# Patient Record
Sex: MALE | Race: WHITE | NOT HISPANIC OR LATINO | Employment: OTHER | URBAN - METROPOLITAN AREA
[De-identification: names, ages, dates, MRNs, and addresses within clinical notes are randomized per-mention and may not be internally consistent; named-entity substitution may affect disease eponyms.]

---

## 2017-07-17 ENCOUNTER — HOSPITAL ENCOUNTER (EMERGENCY)
Facility: HOSPITAL | Age: 52
Discharge: HOME | End: 2017-07-17
Attending: EMERGENCY MEDICINE
Payer: COMMERCIAL

## 2017-07-17 VITALS
HEART RATE: 69 BPM | TEMPERATURE: 98 F | WEIGHT: 180 LBS | DIASTOLIC BLOOD PRESSURE: 69 MMHG | HEIGHT: 67 IN | SYSTOLIC BLOOD PRESSURE: 127 MMHG | BODY MASS INDEX: 28.25 KG/M2 | OXYGEN SATURATION: 99 % | RESPIRATION RATE: 18 BRPM

## 2017-07-17 DIAGNOSIS — Z23 TETANUS TOXOID VACCINATION ADMINISTERED AT CURRENT VISIT: ICD-10-CM

## 2017-07-17 DIAGNOSIS — S09.90XA HEAD INJURY, INITIAL ENCOUNTER: Primary | ICD-10-CM

## 2017-07-17 DIAGNOSIS — S01.01XA SCALP LACERATION, INITIAL ENCOUNTER: ICD-10-CM

## 2017-07-17 PROCEDURE — 12031 INTMD RPR S/A/T/EXT 2.5 CM/<: CPT

## 2017-07-17 PROCEDURE — 99283 EMERGENCY DEPT VISIT LOW MDM: CPT | Mod: 25

## 2017-07-17 PROCEDURE — 90715 TDAP VACCINE 7 YRS/> IM: CPT | Performed by: PHYSICIAN ASSISTANT

## 2017-07-17 PROCEDURE — 90471 IMMUNIZATION ADMIN: CPT | Performed by: PHYSICIAN ASSISTANT

## 2017-07-17 PROCEDURE — 63600175 PHARM REV CODE 636 W HCPCS: Performed by: PHYSICIAN ASSISTANT

## 2017-07-17 RX ADMIN — CLOSTRIDIUM TETANI TOXOID ANTIGEN (FORMALDEHYDE INACTIVATED), CORYNEBACTERIUM DIPHTHERIAE TOXOID ANTIGEN (FORMALDEHYDE INACTIVATED), BORDETELLA PERTUSSIS TOXOID ANTIGEN (GLUTARALDEHYDE INACTIVATED), BORDETELLA PERTUSSIS FILAMENTOUS HEMAGGLUTININ ANTIGEN (FORMALDEHYDE INACTIVATED), BORDETELLA PERTUSSIS PERTACTIN ANTIGEN, AND BORDETELLA PERTUSSIS FIMBRIAE 2/3 ANTIGEN 0.5 ML: 5; 2; 2.5; 5; 3; 5 INJECTION, SUSPENSION INTRAMUSCULAR at 12:07

## 2017-07-17 NOTE — ED PROVIDER NOTES
"   History      Chief Complaint   Patient presents with    Laceration     Lac to top of head. riding in boat and hit limb. No c/o of loc or N/V       Review of patient's allergies indicates:   Allergen Reactions    Penicillins         HPI   HPI    7/17/2017, 12:30 PM   History obtained from the patient      History of Present Illness: Enrique Rojas is a 52 y.o. male patient who presents to the Emergency Department for scalp laceration since a tree limb fell on head about an hour ago.  He says the limp was about 5" diameter and 3' long.  He denies loc, global headache, n/v. No change in mental status per wife. No blood thinners.  Symptoms are moderate in severity.     No further complaints or concerns at this time.           PCP: Primary Doctor No       Past Medical History:  Past Medical History:   Diagnosis Date    Chronic back pain     Diaphragmatic hernia without mention of obstruction or gangrene 5/28/2015    GERD (gastroesophageal reflux disease)          Past Surgical History:  Past Surgical History:   Procedure Laterality Date    ESOPHAGOGASTRODUODENOSCOPY  05/2015    NECK SURGERY      Cut muscle    TONSILLECTOMY             Family History:  History reviewed. No pertinent family history.        Social History:  Social History     Social History Main Topics    Smoking status: Never Smoker    Smokeless tobacco: Never Used    Alcohol use No    Drug use: No    Sexual activity: Not on file       ROS   Review of Systems   Constitutional: Negative for activity change, chills and fever.   HENT: Negative for rhinorrhea and trouble swallowing.    Eyes: Negative for pain and visual disturbance.   Respiratory: Negative for cough and shortness of breath.    Cardiovascular: Negative for chest pain.   Gastrointestinal: Negative for nausea and vomiting.   Genitourinary: Negative for dysuria.   Musculoskeletal: Negative for gait problem and neck stiffness.   Skin: Negative for pallor and rash.   Neurological: " Negative for facial asymmetry, speech difficulty and weakness.   Hematological: Does not bruise/bleed easily.   All other systems reviewed and are negative.    Review of Systems    Physical Exam      Initial Vitals [07/17/17 1223]   BP Pulse Resp Temp SpO2   128/80 68 18 97.9 °F (36.6 °C) 96 %      MAP       96         Physical Exam  Vital signs and nursing notes reviewed.  Constitutional: Patient is in NAD. Awake and alert. Well-developed and well-nourished.  Head:  Normocephalic.  No mata sign.  1.5cm lac to top of head, no step or crep.  Eyes: PERRL. EOM intact. Conjunctivae nl. No scleral icterus.  No hyphema  ENT: Mucous membranes are moist. Oropharynx is clear.  No hematotympanum  Neck: Supple. No JVD. No lymphadenopathy.  No meningismus.  No midline ttp, +FROM  Cardiovascular: Regular rate and rhythm. No murmurs, rubs, or gallops. Distal pulses are 2+ and symmetric.  Pulmonary/Chest: No respiratory distress. Clear to auscultation bilaterally. No wheezing, rales, or rhonchi.  Abdominal: Soft. Non-distended. No TTP. No rebound, guarding, or rigidity. Good bowel sounds.  Genitourinary: No CVA tenderness  Musculoskeletal: Moves all extremities. No edema.   Skin: Warm and dry.  Neurological: Awake and alert. No acute focal neurological deficits are appreciated. No facial droop.  Tongue is midline.  No pronator drift.  Finger to nose normal.  Hand  equal and strong, 5/5 motor strength x 4.   equal and strong bilaterally  Psychiatric: Normal affect. Good eye contact. Appropriate in content.      ED Course          Lac Repair  Date/Time: 7/17/2017 12:39 PM  Performed by: NOHEMY BURGER  Authorized by: FAN JOE   Consent Done: Yes  Consent: Verbal consent obtained.  Risks and benefits: risks, benefits and alternatives were discussed  Consent given by: patient  Patient understanding: patient states understanding of the procedure being performed  Patient consent: the patient's understanding of the  "procedure matches consent given  Procedure consent: procedure consent matches procedure scheduled  Body area: head/neck  Location details: scalp  Laceration length: 1.5 cm  Foreign bodies: no foreign bodies  Tendon involvement: none  Nerve involvement: none  Vascular damage: no  Patient sedated: no  Preparation: Patient was prepped and draped in the usual sterile fashion.  Irrigation solution: saline  Irrigation method: syringe and jet lavage  Amount of cleaning: extensive  Debridement: none  Degree of undermining: none  Skin closure: staples  Number of sutures: 3  Approximation: close  Approximation difficulty: simple  Patient tolerance: Patient tolerated the procedure well with no immediate complications        ED Vital Signs:  Vitals:    07/17/17 1223 07/17/17 1259   BP: 128/80 127/69   Pulse: 68 69   Resp: 18 18   Temp: 97.9 °F (36.6 °C)    TempSrc: Oral    SpO2: 96% 99%   Weight: 81.6 kg (180 lb)    Height: 5' 7" (1.702 m)                  Imaging Results:  Imaging Results    None            The Emergency Provider reviewed the vital signs and test results, which are outlined above.    ED Discussion             Medication(s) given in the ER:  Medications   Tdap vaccine injection 0.5 mL (0.5 mLs Intramuscular Given 7/17/17 1241)           Follow-up Information     [facility] in 2 days.    Specialty:  Internal Medicine  Contact information:  [address] [facility] [address] [phone]  Additional information:  (off [facility]) 1st floor                     New Prescriptions    No medications on file          Medical Decision Making        All findings were reviewed with the patient/family in detail.  Findings seem to be most consistent with a diagnosis of head injury. Closed Head Injury precautions were discussed with patient and/or family/caretaker  Second impact syndrome was also discussed.    All remaining questions and concerns were addressed at that time.  Patient/family " has been counseled regarding the need for follow-up as well as the indication to return to the emergency room should new or worrisome developments occur.        MDM               Clinical Impression:        ICD-10-CM ICD-9-CM   1. Head injury, initial encounter S09.90XA 959.01   2. Scalp laceration, initial encounter S01.01XA 873.0   3. Tetanus toxoid vaccination administered at current visit Z78.9 V49.89             Cleo Hemphill PA-C  07/17/17 6622

## 2018-01-29 ENCOUNTER — HOSPITAL ENCOUNTER (EMERGENCY)
Facility: HOSPITAL | Age: 53
Discharge: HOME | End: 2018-01-29
Attending: EMERGENCY MEDICINE
Payer: COMMERCIAL

## 2018-01-29 VITALS
TEMPERATURE: 99 F | SYSTOLIC BLOOD PRESSURE: 134 MMHG | WEIGHT: 194 LBS | HEART RATE: 56 BPM | HEIGHT: 67 IN | RESPIRATION RATE: 20 BRPM | OXYGEN SATURATION: 98 % | BODY MASS INDEX: 30.45 KG/M2 | DIASTOLIC BLOOD PRESSURE: 83 MMHG

## 2018-01-29 DIAGNOSIS — J06.9 UPPER RESPIRATORY TRACT INFECTION, UNSPECIFIED TYPE: ICD-10-CM

## 2018-01-29 DIAGNOSIS — R05.9 COUGH: Primary | ICD-10-CM

## 2018-01-29 PROCEDURE — 99283 EMERGENCY DEPT VISIT LOW MDM: CPT

## 2018-01-29 RX ORDER — PROMETHAZINE HYDROCHLORIDE AND DEXTROMETHORPHAN HYDROBROMIDE 6.25; 15 MG/5ML; MG/5ML
5 SYRUP ORAL EVERY 6 HOURS PRN
Qty: 120 ML | Refills: 0 | Status: SHIPPED | OUTPATIENT
Start: 2018-01-29 | End: 2018-02-08

## 2018-01-29 NOTE — ED PROVIDER NOTES
Encounter Date: 1/29/2018       History     Chief Complaint   Patient presents with    URI     nasal congestion and cough since fri. denies any aches or pain. no fever. family had dx flu on fri.     The history is provided by the patient.   URI   The primary symptoms include cough. Primary symptoms do not include fever, sore throat, nausea, vomiting, myalgias, arthralgias or rash. The current episode started several days ago.   The cough is productive. There is nondescript sputum produced.   The onset of the illness is associated with exposure to sick contacts. Symptoms associated with the illness include plugged ear sensation and congestion.     Review of patient's allergies indicates:   Allergen Reactions    Penicillins      Past Medical History:   Diagnosis Date    Chronic back pain     Diaphragmatic hernia without mention of obstruction or gangrene 5/28/2015    GERD (gastroesophageal reflux disease)      Past Surgical History:   Procedure Laterality Date    ESOPHAGOGASTRODUODENOSCOPY  05/2015    NECK SURGERY      Cut muscle    TONSILLECTOMY       History reviewed. No pertinent family history.  Social History   Substance Use Topics    Smoking status: Never Smoker    Smokeless tobacco: Never Used    Alcohol use No     Review of Systems   Constitutional: Negative for fever.   HENT: Positive for congestion. Negative for sore throat.    Respiratory: Positive for cough. Negative for shortness of breath.    Cardiovascular: Negative for chest pain.   Gastrointestinal: Negative for nausea and vomiting.   Genitourinary: Negative for dysuria.   Musculoskeletal: Negative for arthralgias, back pain and myalgias.   Skin: Negative for rash.   Neurological: Negative for weakness.   Hematological: Does not bruise/bleed easily.       Physical Exam     Initial Vitals [01/29/18 0856]   BP Pulse Resp Temp SpO2   134/83 (!) 56 20 98.5 °F (36.9 °C) 98 %      MAP       100         Physical Exam    Nursing note and vitals  reviewed.  Constitutional: He appears well-developed and well-nourished. No distress.   HENT:   Head: Normocephalic and atraumatic.   Mouth/Throat: Posterior oropharyngeal erythema present. No oropharyngeal exudate.   Eyes: Conjunctivae and EOM are normal. Pupils are equal, round, and reactive to light.   Neck: Normal range of motion. Neck supple.   Cardiovascular: Normal rate, regular rhythm and normal heart sounds. Exam reveals no gallop and no friction rub.    No murmur heard.  Pulmonary/Chest: Breath sounds normal. No respiratory distress. He has no wheezes. He has no rhonchi. He has no rales.   Abdominal: Soft. Bowel sounds are normal. He exhibits no distension and no mass. There is no tenderness. There is no rebound and no guarding.   Musculoskeletal: Normal range of motion. He exhibits no edema.   Neurological: He is alert and oriented to person, place, and time. He has normal strength.   Skin: Skin is warm and dry. No rash noted.   Psychiatric: He has a normal mood and affect. Thought content normal.         ED Course   Procedures  Labs Reviewed - No data to display                            ED Course      Clinical Impression:       ICD-10-CM ICD-9-CM   1. Cough R05 786.2   2. Upper respiratory tract infection, unspecified type J06.9 465.9         Disposition:   Disposition: Discharged  Condition: Stable                        Chele Riddle MD  01/29/18 0913

## 2019-03-08 ENCOUNTER — HOSPITAL ENCOUNTER (EMERGENCY)
Facility: HOSPITAL | Age: 54
Discharge: HOME | End: 2019-03-08
Attending: EMERGENCY MEDICINE
Payer: COMMERCIAL

## 2019-03-08 VITALS
BODY MASS INDEX: 28.94 KG/M2 | HEART RATE: 67 BPM | SYSTOLIC BLOOD PRESSURE: 128 MMHG | TEMPERATURE: 99 F | WEIGHT: 190.94 LBS | OXYGEN SATURATION: 97 % | RESPIRATION RATE: 20 BRPM | DIASTOLIC BLOOD PRESSURE: 73 MMHG | HEIGHT: 68 IN

## 2019-03-08 DIAGNOSIS — M79.641 RIGHT HAND PAIN: ICD-10-CM

## 2019-03-08 DIAGNOSIS — L03.113 CELLULITIS OF RIGHT HAND: ICD-10-CM

## 2019-03-08 DIAGNOSIS — J06.9 UPPER RESPIRATORY TRACT INFECTION, UNSPECIFIED TYPE: Primary | ICD-10-CM

## 2019-03-08 LAB
INFLUENZA A, MOLECULAR: NEGATIVE
INFLUENZA B, MOLECULAR: NEGATIVE
SPECIMEN SOURCE: NORMAL

## 2019-03-08 PROCEDURE — 25000003 PHARM REV CODE 250: Mod: ER | Performed by: EMERGENCY MEDICINE

## 2019-03-08 PROCEDURE — 87502 INFLUENZA DNA AMP PROBE: CPT | Mod: ER

## 2019-03-08 PROCEDURE — 99284 EMERGENCY DEPT VISIT MOD MDM: CPT | Mod: 25,ER

## 2019-03-08 RX ORDER — SULFAMETHOXAZOLE AND TRIMETHOPRIM 800; 160 MG/1; MG/1
1 TABLET ORAL
Status: COMPLETED | OUTPATIENT
Start: 2019-03-08 | End: 2019-03-08

## 2019-03-08 RX ORDER — ACETAMINOPHEN 500 MG
1000 TABLET ORAL
Status: COMPLETED | OUTPATIENT
Start: 2019-03-08 | End: 2019-03-08

## 2019-03-08 RX ORDER — NAPROXEN 500 MG/1
500 TABLET ORAL 2 TIMES DAILY WITH MEALS
Qty: 20 TABLET | Refills: 0 | Status: SHIPPED | OUTPATIENT
Start: 2019-03-08 | End: 2019-03-18

## 2019-03-08 RX ORDER — SULFAMETHOXAZOLE AND TRIMETHOPRIM 800; 160 MG/1; MG/1
1 TABLET ORAL 2 TIMES DAILY
Qty: 14 TABLET | Refills: 0 | Status: SHIPPED | OUTPATIENT
Start: 2019-03-08 | End: 2019-03-15

## 2019-03-08 RX ADMIN — ACETAMINOPHEN 1000 MG: 500 TABLET ORAL at 10:03

## 2019-03-08 RX ADMIN — SULFAMETHOXAZOLE AND TRIMETHOPRIM 1 TABLET: 800; 160 TABLET ORAL at 11:03

## 2019-03-08 NOTE — ED PROVIDER NOTES
Encounter Date: 3/8/2019       History     Chief Complaint   Patient presents with    Hand Pain     right hand with swelling and pain after puncture type wound while [place]also flu like sypmtoms since 5:30pm yest fever and achy all over.     The history is provided by the patient.   Hand Injury    The incident occurred yesterday. [place] Injury mechanism: fish traylor poked right thenar emenance yesterday. The pain is present in the right hand (right hand). The quality of the pain is described as aching. Pain scale: mild, pt declined pain medications at this point in time. The pain has been constant since the incident. Pertinent negatives include no fever. He reports no foreign bodies present. The symptoms are aggravated by movement, use and palpation. He has tried nothing for the symptoms. The treatment provided no relief.   URI   The primary symptoms include sore throat and cough. Primary symptoms do not include fever, fatigue, headaches, ear pain, swollen glands, wheezing, abdominal pain, nausea, vomiting, myalgias, arthralgias or rash. The current episode started yesterday.   The sore throat began yesterday. The sore throat has been unchanged since its onset. The sore throat is not accompanied by trouble swallowing, drooling, hoarse voice or stridor. Sore Throat Pain Scale: mild.   The cough began yesterday. The cough is non-productive. There is nondescript sputum produced.   The onset of the illness is associated with exposure to sick contacts. Symptoms associated with the illness include congestion. The illness is not associated with chills, plugged ear sensation, facial pain, sinus pressure or rhinorrhea. The following treatments were addressed: Acetaminophen was effective. A decongestant was not tried. Aspirin was not tried. NSAIDs were not tried.     Review of patient's allergies indicates:   Allergen Reactions    Penicillins       Past Medical History:   Diagnosis Date    Chronic back pain     Diaphragmatic hernia without mention of obstruction or gangrene 5/28/2015    GERD (gastroesophageal reflux disease)      Past Surgical History:   Procedure Laterality Date    ESOPHAGOGASTRODUODENOSCOPY  05/2015    ESOPHAGOGASTRODUODENOSCOPY (EGD) N/A 5/17/2016    [facility]    ESOPHAGOGASTRODUODENOSCOPY (EGD) N/A 3/22/2016    Per[facility] by [facility] at [facility]    ESOPHAGOGASTRODUODENOSCOPY (EGD) N/A 5/28/2015    Performed by Lee [facility]ammell [facility] at [facility] ENDO    NECK SURGERY      Cut muscle    TONSILLECTOMY       History reviewed. No pertinent family history.  Social History     Tobacco Use    Smoking status: Never Smoker    Smokeless tobacco: Never Used   Substance Use Topics    Alcohol use: No    Drug use: No     Review of Systems   Constitutional: Negative for chills, fatigue and fever.   HENT: Positive for congestion and sore throat. Negative for drooling, ear pain, hoarse voice, rhinorrhea, sinus pressure and trouble swallowing.    Respiratory: Positive for cough. Negative for shortness of breath, wheezing and stridor.    Cardiovascular: Negative for chest pain.   Gastrointestinal: Negative for abdominal pain, nausea and vomiting.   Genitourinary: Negative for dysuria.   Musculoskeletal: Negative for arthralgias, back pain and myalgias.   Skin: Negative for rash.   Neurological: Negative for weakness and headaches.   Hematological: Does not bruise/bleed easily.   All other systems reviewed and are negative.      Physical Exam     Initial Vitals [03/08/19 1038]   BP Pulse Resp Temp SpO2   128/73 67 20 98.9 °F (37.2 °C) 97 %      MAP       --         Physical Exam    Nursing note and vitals reviewed.  Constitutional: He appears well-developed and well-nourished. He is not diaphoretic. No distress.   HENT:   Head: Normocephalic and atraumatic.   Right Ear: Hearing, tympanic membrane,  external ear and ear canal normal.   Left Ear: Hearing, tympanic membrane, external ear and ear canal normal.   Nose: Mucosal edema present. Right sinus exhibits no maxillary sinus tenderness and no frontal sinus tenderness. Left sinus exhibits no maxillary sinus tenderness and no frontal sinus tenderness.   Mouth/Throat: Uvula is midline and mucous membranes are normal. Posterior oropharyngeal erythema present.   Eyes: EOM are normal. Pupils are equal, round, and reactive to light. No scleral icterus.   Neck: Trachea normal, normal range of motion and phonation normal. Neck supple. No thyromegaly present.   Cardiovascular: Normal rate, regular rhythm, normal heart sounds and intact distal pulses. Exam reveals no gallop and no friction rub.    No murmur heard.  Pulmonary/Chest: Breath sounds normal. No respiratory distress. He has no wheezes. He has no rhonchi. He exhibits no tenderness.   Abdominal: Soft. Bowel sounds are normal. He exhibits no distension. There is no tenderness. There is no rebound and no guarding.   Musculoskeletal: Normal range of motion. He exhibits no edema.        Right wrist: He exhibits tenderness.        Right forearm: He exhibits tenderness.        Right hand: He exhibits tenderness and swelling. Normal sensation noted. Normal strength noted.        Hands:  Small puncture wound with surrounding erythema and edema with tracking up right forearm   Lymphadenopathy:     He has no cervical adenopathy.   Neurological: He is alert and oriented to person, place, and time. He has normal strength. No cranial nerve deficit or sensory deficit.   Skin: Skin is warm and dry.   Psychiatric: He has a normal mood and affect. His behavior is normal. Judgment and thought content normal.         ED Course   Procedures  Labs Reviewed   INFLUENZA A & B BY MOLECULAR          Imaging Results          X-Ray Wrist Complete Right (Final result)  Result time 03/08/19 11:14:48    Final result by GANESH Basilio Sr.,  "MD (03/08/19 11:14:48)                 Impression:      Normal study.      Electronically signed by: Mitch Basilio MD  Date:    03/08/2019  Time:    11:14             Narrative:    EXAMINATION:  XR WRIST COMPLETE 3 VIEWS RIGHT    CLINICAL HISTORY:  Pain in right hand    COMPARISON:  None    FINDINGS:  There is no fracture. There is no dislocation.                               X-Ray Hand 3 view Right (Final result)  Result time 03/08/19 11:13:43    Final result by GANESH Basilio Sr., MD (03/08/19 11:13:43)                 Impression:      Normal study.      Electronically signed by: Mitch Basilio MD  Date:    03/08/2019  Time:    11:13             Narrative:    EXAMINATION:  XR HAND COMPLETE 3 VIEW RIGHT    CLINICAL HISTORY:  right hand pain;    COMPARISON:  None    FINDINGS:  There is no fracture. There is no dislocation.                                       Vitals:    03/08/19 1038   BP: 128/73   Pulse: 67   Resp: 20   Temp: 98.9 °F (37.2 °C)   TempSrc: Oral   SpO2: 97%   Weight: 86.6 kg (190 lb 14.7 oz)   Height: 5' 8" (1.727 m)       Results for orders placed or performed during the hospital encounter of 03/08/19   Influenza A & B by Molecular   Result Value Ref Range    Influenza A, Molecular Negative Negative    Influenza B, Molecular Negative Negative    Flu A & B Source NP          Imaging Results          X-Ray Wrist Complete Right (Final result)  Result time 03/08/19 11:14:48    Final result by GANESH Basilio Sr., MD (03/08/19 11:14:48)                 Impression:      Normal study.      Electronically signed by: Mitch Basilio MD  Date:    03/08/2019  Time:    11:14             Narrative:    EXAMINATION:  XR WRIST COMPLETE 3 VIEWS RIGHT    CLINICAL HISTORY:  Pain in right hand    COMPARISON:  None    FINDINGS:  There is no fracture. There is no dislocation.                               X-Ray Hand 3 view Right (Final result)  Result time 03/08/19 11:13:43    Final result by GANESH Basilio Sr." MD (03/08/19 11:13:43)                 Impression:      Normal study.      Electronically signed by: Mitch Basilio MD  Date:    03/08/2019  Time:    11:13             Narrative:    EXAMINATION:  XR HAND COMPLETE 3 VIEW RIGHT    CLINICAL HISTORY:  right hand pain;    COMPARISON:  None    FINDINGS:  There is no fracture. There is no dislocation.                                Medications   acetaminophen tablet 1,000 mg (1,000 mg Oral Given 3/8/19 1049)   sulfamethoxazole-trimethoprim 800-160mg per tablet 1 tablet (1 tablet Oral Given 3/8/19 1126)       11:17 AM - Re-evaluation: The patient is resting comfortably and is in no acute distress. He states that his symptoms have improved after treatment within ER. Discussed test results, shared treatment plan, specific conditions for return, and importance of follow up with patient and family.  He understands and agrees with the plan as discussed. Answered  his questions at this time. He has remained hemodynamically stable throughout the ED course and is appropriate for discharge home.     Regarding UPPER RESPIRATORY ILLNESS, for treatment, I encouraged patient to: drink plenty of fluids; get lots of rest; take medications as prescribed; use over-the-counter medications for symptomatic treatment;  and use a humidifier or steam in the bathroom to improve patency of upper airway.  Patient instructed to notify primary care provider, or return to the emergency department, if the they: have a cough most days or have a cough that returns frequently; begin coughing up blood; develop a high fever or shaking chills; have a low-grade fever for three or more days; develop thick, greenish mucus, especially if it has a bad smell; and experience shortness of breath or chest pain. For prevention, discussed with patient the importance of refraining from smoking (if applicable), getting annual influenza vaccines, reducing exposure to air pollution, and the need to frequently wash hands to  avoid spread of infection.     For  CELLULITIS, I advised patient to: keep area clean and dry; apply antibiotic ointment as prescribed; refrain from squeezing or irritating site; apply moist heat to help with pain and abscess drainage; and to take antibiotics as prescribed. Patient was instructed to follow up with primary care provider, urgent care facility, or the emergency room if symptoms worsen and they develop a fever greater than 102.5 °F, have pain unrelieved by OTC or prescription medications, and excessive swelling. Also instructed patient to follow up with provider in 24-48 hours for wound re-check.    Pre-hypertension/Hypertension: The pt has been informed that they may have pre-hypertension or hypertension based on a blood pressure reading in the ED. I recommend that the pt call the PCP listed on their discharge instructions or a physician of their choice this week to arrange f/u for further evaluation of possible pre-hypertension or hypertension.     Enrique Rojas was given a handout which discussed their disease process, precautions, and instructions for follow-up and therapy.    Follow-up Information     [facility]. Schedule an appointment as soon as possible for a visit in 1 week.    Contact information:  [address]  [place] [address]  [phone]             [facility] - Internal Medicine. Schedule an appointment as soon as possible for a visit in 1 week.    Specialty:  Internal Medicine  Contact information:  [address] [address] [id number] [phone]           [facility].    Specialty:  Emergency Medicine  Why:  As needed, If symptoms worsen  Contact information:  [address] [address] [id number] [phone]                    Medication List      START taking these medications    naproxen 500 MG EC tablet  Commonly known as:  EC NAPROSYN  Take 1 tablet (500 mg total) by mouth 2 (two) times daily with meals. for  10 days     sulfamethoxazole-trimethoprim 800-160mg 800-160 mg Tab  Commonly known as:  BACTRIM DS  Take 1 tablet by mouth 2 (two) times daily. for 7 days        ASK your doctor about these medications    fluticasone 50 mcg/actuation nasal spray  Commonly known as:  FLONASE     omeprazole 20 MG capsule  Commonly known as:  PRILOSEC  Take 2 capsules (40 mg total) by mouth once daily.           Where to Get Your Medications      You can get these medications from any pharmacy    Bring a paper prescription for each of these medications  · naproxen 500 MG EC tablet  · sulfamethoxazole-trimethoprim 800-160mg 800-160 mg Tab        Current Discharge Medication List            ED Diagnosis  1. Upper respiratory tract infection, unspecified type    2. Right hand pain    3. Cellulitis of right hand                          Clinical Impression:       ICD-10-CM ICD-9-CM   1. Upper respiratory tract infection, unspecified type J06.9 465.9   2. Right hand pain M79.641 729.5   3. Cellulitis of right hand L03.113 682.4         Disposition:   Disposition: Discharged  Condition: Stable                        Carlos Gillespie Jr., MD  03/08/19 1535

## 2019-03-08 NOTE — ED NOTES
Aaox3, skin warm and dry, resp unlabored and even. amb with steady gait and byers well. C/o right hand pain with swelling post puncturing yest with fish traylor and also feels may have flu- fever and achy all over.

## 2019-03-08 NOTE — DISCHARGE INSTRUCTIONS
For  CELLULITIS, I advised patient to: keep area clean and dry; apply antibiotic ointment as prescribed; refrain from squeezing or irritating site; apply moist heat to help with pain and abscess drainage; and to take antibiotics as prescribed. Patient was instructed to follow up with primary care provider, urgent care facility, or the emergency room if symptoms worsen and they develop a fever greater than 102.5 °F, have pain unrelieved by OTC or prescription medications, and excessive swelling. Also instructed patient to follow up with provider in 24-48 hours for wound re-check.    Regarding UPPER RESPIRATORY ILLNESS, for treatment, I encouraged patient to: drink plenty of fluids; get lots of rest; take medications as prescribed; use over-the-counter medications for symptomatic treatment;  and use a humidifier or steam in the bathroom to improve patency of upper airway.  Patient instructed to notify primary care provider, or return to the emergency department, if the they: have a cough most days or have a cough that returns frequently; begin coughing up blood; develop a high fever or shaking chills; have a low-grade fever for three or more days; develop thick, greenish mucus, especially if it has a bad smell; and experience shortness of breath or chest pain. For prevention, discussed with patient the importance of refraining from smoking (if applicable), getting annual influenza vaccines, reducing exposure to air pollution, and the need to frequently wash hands to avoid spread of infection.

## 2023-06-29 PROBLEM — Z76.89 ENCOUNTER TO ESTABLISH CARE: Status: ACTIVE | Noted: 2023-06-29

## 2023-06-29 PROBLEM — K21.9 GERD (GASTROESOPHAGEAL REFLUX DISEASE): Status: ACTIVE | Noted: 2023-06-29

## 2023-06-29 PROBLEM — K40.90 LEFT INGUINAL HERNIA: Status: ACTIVE | Noted: 2023-06-29

## 2023-08-10 PROBLEM — R00.1 BRADYCARDIA: Status: ACTIVE | Noted: 2023-08-10

## 2023-08-10 PROBLEM — Z00.00 WELLNESS EXAMINATION: Status: ACTIVE | Noted: 2023-08-10

## 2023-08-10 PROBLEM — E78.5 HYPERLIPIDEMIA: Status: ACTIVE | Noted: 2023-08-10

## 2023-08-15 DIAGNOSIS — K40.90 INGUINAL HERNIA OF LEFT SIDE WITHOUT OBSTRUCTION OR GANGRENE: ICD-10-CM

## 2023-08-15 DIAGNOSIS — K40.90 LEFT INGUINAL HERNIA: Primary | ICD-10-CM

## 2023-08-15 RX ORDER — SODIUM CHLORIDE 9 MG/ML
INJECTION, SOLUTION INTRAVENOUS CONTINUOUS
Status: CANCELLED | OUTPATIENT
Start: 2023-08-15

## 2023-08-18 ENCOUNTER — HOSPITAL ENCOUNTER (OUTPATIENT)
Dept: PREADMISSION TESTING | Facility: HOSPITAL | Age: 58
Discharge: HOME | End: 2023-08-18
Attending: SURGERY
Payer: COMMERCIAL

## 2023-08-18 ENCOUNTER — ANESTHESIA EVENT (OUTPATIENT)
Dept: SURGERY | Facility: HOSPITAL | Age: 58
End: 2023-08-18
Payer: COMMERCIAL

## 2023-08-18 VITALS — BODY MASS INDEX: 28.25 KG/M2 | WEIGHT: 180 LBS | HEIGHT: 67 IN

## 2023-08-18 NOTE — PRE-PROCEDURE INSTRUCTIONS
PREVENTION PLUS EKG ON CHART/SCANNED INTO MEDIA DATED 08/10/98049-YWAH TO DR. STEWART    HR 08/18/2023 59 bpm

## 2023-08-18 NOTE — ANESTHESIA PREPROCEDURE EVALUATION
08/18/2023  Enrique Rojas is a 58 y.o., male.      Pre-op Assessment    I have reviewed the Patient Summary Reports.    I have reviewed the NPO Status.   I have reviewed the Medications.     Review of Systems  Anesthesia Hx:  No problems with previous Anesthesia  Denies Family Hx of Anesthesia complications.   Denies Personal Hx of Anesthesia complications.   Social:  Non-Smoker    Cardiovascular:  Cardiovascular Normal     Pulmonary:  Pulmonary Normal    Renal/:  Renal/ Normal     Hepatic/GI:   GERD, well controlled    Neurological:  Neurology Normal    Endocrine:  Endocrine Normal      Lab Results   Component Value Date    WBC 4.2 07/06/2023    HGB 14.5 07/06/2023    HCT 43.0 07/06/2023    MCV 91 07/06/2023     (L) 07/06/2023     CMP  Sodium   Date Value Ref Range Status   07/06/2023 139 134 - 144 mmol/L Final     Potassium   Date Value Ref Range Status   07/06/2023 4.2 3.5 - 5.2 mmol/L Final     Chloride   Date Value Ref Range Status   07/06/2023 105 96 - 106 mmol/L Final     CO2   Date Value Ref Range Status   07/06/2023 23 20 - 29 mmol/L Final     Glucose   Date Value Ref Range Status   07/06/2023 91 70 - 99 mg/dL Final     BUN   Date Value Ref Range Status   07/06/2023 14 8 - 27 mg/dL Final     Creatinine   Date Value Ref Range Status   07/06/2023 0.99 0.76 - 1.27 mg/dL Final     Calcium   Date Value Ref Range Status   07/06/2023 9.0 8.6 - 10.2 mg/dL Final     Albumin   Date Value Ref Range Status   07/06/2023 3.9 3.8 - 4.9 g/dL Final     Comment:                    **Effective July 10, 2023 Albumin reference interval**                   will be changing to:                              Age                  Male          Female                             0 -   7 days       3.6 - 4.9      3.6 - 4.9                             8 -  30 days       3.5 - 4.6      3.5 - 4.6                              1 -   6 months     3.7 - 4.8      3.7 - 4.8                      7 months -   2 years      4.0 - 5.0      4.0 - 5.0                             3 -   5 years      4.1 - 5.0      4.1 - 5.0                             6 -  12 years      4.2 - 5.0      4.2 - 5.0                            13 -  30 years      4.3 - 5.2      4.0 - 5.0                            31 -  50 years      4.1 - 5.1      3.9 - 4.9                            51 -  60 years      3.8 - 4.9      3.8 - 4.9                            61 -  70 years      3.9 - 4.9      3.9 - 4.9                            71 -  80 years      3.8 - 4.8      3.8 - 4.8                            81 -  89 years      3.7 - 4.7      3.7 - 4.7                            90 - 199 years      3.6 - 4.6      3.6 - 4.6       Total Bilirubin   Date Value Ref Range Status   07/06/2023 0.4 0.0 - 1.2 mg/dL Final     AST   Date Value Ref Range Status   07/06/2023 18 0 - 40 IU/L Final     ALT   Date Value Ref Range Status   07/06/2023 18 0 - 44 IU/L Final     eGFR   Date Value Ref Range Status   07/06/2023 87 >59 mL/min/1.73 Final       Physical Exam  General: Well nourished    Airway:  Mallampati: III / II  Mouth Opening: Normal  TM Distance: Normal  Tongue: Normal  Neck ROM: Normal ROM    Dental:  Intact    Chest/Lungs:  Clear to auscultation    Heart:  Rate: Normal  Rhythm: Regular Rhythm  Sounds: Normal        Anesthesia Plan  Type of Anesthesia, risks & benefits discussed:    Anesthesia Type: Gen ETT, Gen Supraglottic Airway  Intra-op Monitoring Plan: Standard ASA Monitors  Post Op Pain Control Plan: multimodal analgesia  Induction:  IV  Airway Plan: Direct  Informed Consent: Informed consent signed with the Patient and all parties understand the risks and agree with anesthesia plan.  All questions answered.   ASA Score: 2  Day of Surgery Review of History & Physical: I have interviewed and examined the patient. I have reviewed the patient's H&P dated: There are no significant  changes.     Ready For Surgery From Anesthesia Perspective.     .

## 2023-08-22 ENCOUNTER — ANESTHESIA (OUTPATIENT)
Dept: SURGERY | Facility: HOSPITAL | Age: 58
End: 2023-08-22
Payer: COMMERCIAL

## 2023-08-22 ENCOUNTER — HOSPITAL ENCOUNTER (OUTPATIENT)
Facility: HOSPITAL | Age: 58
Discharge: HOME | End: 2023-08-22
Attending: SURGERY | Admitting: SURGERY
Payer: COMMERCIAL

## 2023-08-22 VITALS
RESPIRATION RATE: 20 BRPM | OXYGEN SATURATION: 99 % | DIASTOLIC BLOOD PRESSURE: 71 MMHG | SYSTOLIC BLOOD PRESSURE: 130 MMHG | TEMPERATURE: 98 F | HEART RATE: 49 BPM

## 2023-08-22 DIAGNOSIS — K40.90 INGUINAL HERNIA OF LEFT SIDE WITHOUT OBSTRUCTION OR GANGRENE: Primary | ICD-10-CM

## 2023-08-22 PROCEDURE — 63600175 PHARM REV CODE 636 W HCPCS: Performed by: SURGERY

## 2023-08-22 PROCEDURE — 36000706: Performed by: SURGERY

## 2023-08-22 PROCEDURE — 25000003 PHARM REV CODE 250: Performed by: SURGERY

## 2023-08-22 PROCEDURE — 36000707: Performed by: SURGERY

## 2023-08-22 PROCEDURE — C1729 CATH, DRAINAGE: HCPCS | Performed by: SURGERY

## 2023-08-22 PROCEDURE — 71000033 HC RECOVERY, INTIAL HOUR: Performed by: SURGERY

## 2023-08-22 PROCEDURE — 37000008 HC ANESTHESIA 1ST 15 MINUTES: Performed by: SURGERY

## 2023-08-22 PROCEDURE — 63600175 PHARM REV CODE 636 W HCPCS: Performed by: NURSE ANESTHETIST, CERTIFIED REGISTERED

## 2023-08-22 PROCEDURE — C1781 MESH (IMPLANTABLE): HCPCS | Performed by: SURGERY

## 2023-08-22 PROCEDURE — 25000003 PHARM REV CODE 250: Performed by: NURSE ANESTHETIST, CERTIFIED REGISTERED

## 2023-08-22 PROCEDURE — 71000015 HC POSTOP RECOV 1ST HR: Performed by: SURGERY

## 2023-08-22 PROCEDURE — 37000009 HC ANESTHESIA EA ADD 15 MINS: Performed by: SURGERY

## 2023-08-22 PROCEDURE — 71000016 HC POSTOP RECOV ADDL HR: Performed by: SURGERY

## 2023-08-22 DEVICE — SELF-GRIPPING POLYESTER MESH,LEFT ANATOMICAL, POLYESTER WITH POLYLACTIC ACID GRIPS
Type: IMPLANTABLE DEVICE | Site: GROIN | Status: FUNCTIONAL
Brand: PROGRIP

## 2023-08-22 RX ORDER — ONDANSETRON 2 MG/ML
4 INJECTION INTRAMUSCULAR; INTRAVENOUS EVERY 6 HOURS PRN
Status: DISCONTINUED | OUTPATIENT
Start: 2023-08-22 | End: 2023-08-22 | Stop reason: HOSPADM

## 2023-08-22 RX ORDER — MIDAZOLAM HYDROCHLORIDE 1 MG/ML
INJECTION INTRAMUSCULAR; INTRAVENOUS
Status: DISCONTINUED | OUTPATIENT
Start: 2023-08-22 | End: 2023-08-22

## 2023-08-22 RX ORDER — EPHEDRINE SULFATE 50 MG/ML
INJECTION, SOLUTION INTRAVENOUS
Status: DISCONTINUED | OUTPATIENT
Start: 2023-08-22 | End: 2023-08-22

## 2023-08-22 RX ORDER — ONDANSETRON 2 MG/ML
INJECTION INTRAMUSCULAR; INTRAVENOUS
Status: DISCONTINUED | OUTPATIENT
Start: 2023-08-22 | End: 2023-08-22

## 2023-08-22 RX ORDER — HYDROMORPHONE HYDROCHLORIDE 2 MG/ML
0.5 INJECTION, SOLUTION INTRAMUSCULAR; INTRAVENOUS; SUBCUTANEOUS EVERY 5 MIN PRN
Status: DISCONTINUED | OUTPATIENT
Start: 2023-08-22 | End: 2023-08-22 | Stop reason: HOSPADM

## 2023-08-22 RX ORDER — SODIUM CHLORIDE 9 MG/ML
INJECTION, SOLUTION INTRAVENOUS CONTINUOUS
Status: DISCONTINUED | OUTPATIENT
Start: 2023-08-22 | End: 2023-08-22 | Stop reason: HOSPADM

## 2023-08-22 RX ORDER — PROCHLORPERAZINE EDISYLATE 5 MG/ML
5 INJECTION INTRAMUSCULAR; INTRAVENOUS EVERY 6 HOURS PRN
Status: DISCONTINUED | OUTPATIENT
Start: 2023-08-22 | End: 2023-08-22 | Stop reason: HOSPADM

## 2023-08-22 RX ORDER — HYDROMORPHONE HYDROCHLORIDE 1 MG/ML
1 INJECTION, SOLUTION INTRAMUSCULAR; INTRAVENOUS; SUBCUTANEOUS EVERY 4 HOURS PRN
Status: DISCONTINUED | OUTPATIENT
Start: 2023-08-22 | End: 2023-08-22 | Stop reason: HOSPADM

## 2023-08-22 RX ORDER — MORPHINE SULFATE 10 MG/ML
INJECTION, SOLUTION INTRAMUSCULAR; INTRAVENOUS
Status: DISCONTINUED | OUTPATIENT
Start: 2023-08-22 | End: 2023-08-22

## 2023-08-22 RX ORDER — FENTANYL CITRATE 50 UG/ML
INJECTION, SOLUTION INTRAMUSCULAR; INTRAVENOUS
Status: DISCONTINUED | OUTPATIENT
Start: 2023-08-22 | End: 2023-08-22

## 2023-08-22 RX ORDER — PROPOFOL 10 MG/ML
INJECTION, EMULSION INTRAVENOUS
Status: DISCONTINUED | OUTPATIENT
Start: 2023-08-22 | End: 2023-08-22

## 2023-08-22 RX ORDER — DIPHENHYDRAMINE HYDROCHLORIDE 50 MG/ML
25 INJECTION INTRAMUSCULAR; INTRAVENOUS EVERY 6 HOURS PRN
Status: DISCONTINUED | OUTPATIENT
Start: 2023-08-22 | End: 2023-08-22 | Stop reason: HOSPADM

## 2023-08-22 RX ORDER — BUPIVACAINE HYDROCHLORIDE AND EPINEPHRINE 2.5; 5 MG/ML; UG/ML
INJECTION, SOLUTION EPIDURAL; INFILTRATION; INTRACAUDAL; PERINEURAL
Status: DISCONTINUED | OUTPATIENT
Start: 2023-08-22 | End: 2023-08-22 | Stop reason: HOSPADM

## 2023-08-22 RX ORDER — KETOROLAC TROMETHAMINE 30 MG/ML
INJECTION, SOLUTION INTRAMUSCULAR; INTRAVENOUS
Status: DISCONTINUED | OUTPATIENT
Start: 2023-08-22 | End: 2023-08-22

## 2023-08-22 RX ORDER — LIDOCAINE HYDROCHLORIDE 10 MG/ML
INJECTION, SOLUTION INTRAVENOUS
Status: DISCONTINUED | OUTPATIENT
Start: 2023-08-22 | End: 2023-08-22

## 2023-08-22 RX ORDER — ACETAMINOPHEN 10 MG/ML
INJECTION, SOLUTION INTRAVENOUS
Status: DISCONTINUED | OUTPATIENT
Start: 2023-08-22 | End: 2023-08-22

## 2023-08-22 RX ORDER — ONDANSETRON 2 MG/ML
4 INJECTION INTRAMUSCULAR; INTRAVENOUS DAILY PRN
Status: DISCONTINUED | OUTPATIENT
Start: 2023-08-22 | End: 2023-08-22 | Stop reason: HOSPADM

## 2023-08-22 RX ORDER — MORPHINE SULFATE 4 MG/ML
4 INJECTION, SOLUTION INTRAMUSCULAR; INTRAVENOUS EVERY 5 MIN PRN
Status: DISCONTINUED | OUTPATIENT
Start: 2023-08-22 | End: 2023-08-22 | Stop reason: HOSPADM

## 2023-08-22 RX ORDER — TRAMADOL HYDROCHLORIDE 50 MG/1
50 TABLET ORAL EVERY 4 HOURS PRN
Status: DISCONTINUED | OUTPATIENT
Start: 2023-08-22 | End: 2023-08-22 | Stop reason: HOSPADM

## 2023-08-22 RX ORDER — HYDROCODONE BITARTRATE AND ACETAMINOPHEN 5; 325 MG/1; MG/1
1 TABLET ORAL EVERY 4 HOURS PRN
Status: DISCONTINUED | OUTPATIENT
Start: 2023-08-22 | End: 2023-08-22 | Stop reason: HOSPADM

## 2023-08-22 RX ADMIN — SODIUM CHLORIDE: 9 INJECTION, SOLUTION INTRAVENOUS at 12:08

## 2023-08-22 RX ADMIN — MIDAZOLAM 2 MG: 1 INJECTION INTRAMUSCULAR; INTRAVENOUS at 11:08

## 2023-08-22 RX ADMIN — MORPHINE SULFATE 5 MG: 10 INJECTION, SOLUTION INTRAMUSCULAR; INTRAVENOUS at 01:08

## 2023-08-22 RX ADMIN — FENTANYL CITRATE 50 MCG: 50 INJECTION, SOLUTION INTRAMUSCULAR; INTRAVENOUS at 12:08

## 2023-08-22 RX ADMIN — ACETAMINOPHEN 1000 MG: 10 INJECTION, SOLUTION INTRAVENOUS at 12:08

## 2023-08-22 RX ADMIN — PROPOFOL 200 MG: 10 INJECTION, EMULSION INTRAVENOUS at 11:08

## 2023-08-22 RX ADMIN — ONDANSETRON HYDROCHLORIDE 4 MG: 2 SOLUTION INTRAMUSCULAR; INTRAVENOUS at 02:08

## 2023-08-22 RX ADMIN — EPHEDRINE SULFATE 10 MG: 50 INJECTION, SOLUTION INTRAVENOUS at 12:08

## 2023-08-22 RX ADMIN — SODIUM CHLORIDE: 9 INJECTION, SOLUTION INTRAVENOUS at 10:08

## 2023-08-22 RX ADMIN — ONDANSETRON 4 MG: 2 INJECTION INTRAMUSCULAR; INTRAVENOUS at 11:08

## 2023-08-22 RX ADMIN — KETOROLAC TROMETHAMINE 30 MG: 30 INJECTION, SOLUTION INTRAMUSCULAR at 01:08

## 2023-08-22 RX ADMIN — VANCOMYCIN HYDROCHLORIDE 1000 MG: 1 INJECTION, POWDER, LYOPHILIZED, FOR SOLUTION INTRAVENOUS at 11:08

## 2023-08-22 RX ADMIN — LIDOCAINE HYDROCHLORIDE 50 MG: 10 INJECTION, SOLUTION INTRAVENOUS at 11:08

## 2023-08-22 RX ADMIN — FENTANYL CITRATE 100 MCG: 50 INJECTION, SOLUTION INTRAMUSCULAR; INTRAVENOUS at 11:08

## 2023-08-22 NOTE — DISCHARGE INSTRUCTIONS
No driving  or alcohol 24 hours after surgery    no heavy lifting   straining   strenuous activity     follow up with dr evangelista as scheduled    in 48 hours shower  remove dressing  clean with antibacterial soap and water  and keep dry   no tub baths  shower   only   do not submerge area in water

## 2023-08-22 NOTE — OP NOTE
[facility] - Surgery  Inguinal Herniorrhaphy  Procedure Note    SUMMARY     Date of Procedure: 8/22/2023     Procedure: Procedure(s) (LRB):  REPAIR, HERNIA, INGUINAL with mesh (Left)     Surgeon(s) and Role:     * Cadence Hernandez MD - Primary    Assisting Surgeon: None    Indications: The patient has a symptomatic left inguinal hernia.      Pre-Operative Diagnosis: left inguinal hernia    Post-Operative Diagnosis: left inguinal hernia    Anesthesia: General with local(1/2% marcaine with epi)    Technical Procedures Used: Antonina repair     Description of the Findings of the Procedure: large indirect sac with slightly weak floor    The patient was seen in the Holding Room. The risks, benefits, complications, treatment options, and expected outcomes were discussed with the patient. The possibilities of reaction to medication, pain, infection, bleeding, heart attack, death, injury to internal organs, recurrence, testicular damage, infertility, or need for further surgery were discussed with the patient. The patient concurred with the proposed plan, giving informed consent.  The site of surgery properly noted/marked. The patient was taken to Operating Room # A, identified as Enrique Rojas and the procedure verified as Herniorrhaphy. A Time Out was held and the above information confirmed.    The patient was placed supine.  After the patient was anesthetized, the groin was prepped and draped in the standard fashion. Marcaine 0.5% with epinephrine was used to anesthetize the skin over the inguinal ligament along a line from the level of the anterior iliac spine to the pubic tubercle.      A low transverse incision was created in the groin, carried down throught the subcutaneous tissues with cautery.  The external oblique fascia was identified and incised parallel to its fibers. It was opened through the external ring. The ilioinguinal nerve was dissected free, protected, and preserved throughout the procedure.  Cord structures were encircled at the level of the pubic tubercle and a penrose drain passed. An indirect hernia was identified. The hernia sac was mobilized from the surrounding cord structures. The sac was reduced into the abdominal cavity.  A suture was placed with 0-vicryl at the inguinal ring to keep the sac reduced by reapproximating the transversalis to the inguinal ligament.  A Antonina-type repair with polypropylene mesh in a keyhole fashion was used. The spermatic cord was passed through an opening in the mesh laterally and moved freely in the mesh opening which admitted the tip of the index finger.  The spermatic cord and the ilioinguinal nerve were returned to their anatomic locations.    The external aponeurosis was closed with a running 3-0 Vicryl suture and the skin incision was closed in layers with a 3-0 Vicryl subcutaneous in Chu's fascia and a 4-0 Monocryl subcuticular closure. Sterile dressings applied.    Sponge, needle and instrument counts were correct at the end of the case.  left indirect hernia with weak floor    Complications: None; patient tolerated the procedure well.    Total IV Fluids: 1200ml    Estimated Blood Loss (EBL):  5mL           Drains: none    Implants: Progrip 12x8 left    Specimens: none           Condition: stable    Disposition: PACU - hemodynamically stable.    Attestation: I performed the procedure.

## 2023-08-22 NOTE — DISCHARGE SUMMARY
Discharge Summary  General Surgery      Admit Date: 8/22/2023    Discharge Date :8/22/2023    Attending Physician: Cadence Hernandez     Discharge Physician: Cadence Hernandez    Discharged Condition: good    Discharge Diagnosis: Left inguinal hernia [K40.90]    Treatments/Procedures: Procedure(s) (LRB):  REPAIR, HERNIA, INGUINAL with mesh (Left)    Hospital Course: Uneventful; Discharged home from Recovery    Significant Diagnostic Studies: none    Disposition: Home or Self Care    Diet: Regular    Follow up: Office 10-14 days    Activity: No heavy lifting till seen in office.    Patient Instructions:   Current Discharge Medication List        CONTINUE these medications which have NOT CHANGED    Details   omeprazole (PRILOSEC) 20 MG capsule Take 2 capsules (40 mg total) by mouth once daily.  Qty: 60 capsule, Refills: 11    Associated Diagnoses: Other dysphagia; Gastroesophageal reflux disease with esophagitis; Eosinophilic esophagitis             Discharge Procedure Orders   Diet general     Remove dressing in 48 hours     Call MD for:  temperature >100.4     Call MD for:  persistent nausea and vomiting     Call MD for:  severe uncontrolled pain     Call MD for:  difficulty breathing, headache or visual disturbances     Call MD for:  redness, tenderness, or signs of infection (pain, swelling, redness, odor or green/yellow discharge around incision site)     Ice to affected area     Lifting restrictions   Order Comments: No lifting greater than 10 pounds for 6 weeks     Activity as tolerated     Shower on day dressing removed (No bath)

## 2023-08-22 NOTE — PLAN OF CARE
1550    pt ambulated  to bathroom   and urinated  has nausea off and on   pt states has something at home for nausea if needed

## 2023-08-22 NOTE — TRANSFER OF CARE
Anesthesia Transfer of Care Note    Patient: Enrique Rojas    Procedure(s) Performed: Procedure(s) (LRB):  REPAIR, HERNIA, INGUINAL with mesh (Left)    Patient location: PACU    Anesthesia Type: general    Transport from OR: Transported from OR on room air with adequate spontaneous ventilation    Post pain: adequate analgesia    Post assessment: no apparent anesthetic complications    Post vital signs: stable    Level of consciousness: awake    Nausea/Vomiting: no nausea/vomiting    Complications: none    Transfer of care protocol was followed      Last vitals:   90/50  16 RR  37 C TEMP  50 HR  100% O2 SAT

## 2023-08-23 NOTE — ANESTHESIA POSTPROCEDURE EVALUATION
Anesthesia Post Evaluation    Patient: Enrique Rojas    Procedure(s) Performed: Procedure(s) (LRB):  REPAIR, HERNIA, INGUINAL with mesh (Left)    Final Anesthesia Type: general      Patient location during evaluation: OPS  Patient participation: Yes- Able to Participate  Level of consciousness: awake  Post-procedure vital signs: reviewed and stable  Pain management: adequate  Airway patency: patent    PONV status at discharge: No PONV  Anesthetic complications: no      Cardiovascular status: blood pressure returned to baseline  Respiratory status: spontaneous ventilation  Hydration status: euvolemic  Follow-up not needed.          Vitals Value Taken Time   /71 08/22/23 1519   Temp 36.4 °C (97.5 °F) 08/22/23 1519   Pulse 49 08/22/23 1519   Resp 20 08/22/23 1519   SpO2 99 % 08/22/23 1519         Event Time   Out of Recovery 14:03:00         Pain/Sandie Score: Sandie Score: 10 (8/22/2023  3:25 PM)

## 2023-11-13 PROBLEM — Z00.00 WELLNESS EXAMINATION: Status: RESOLVED | Noted: 2023-08-10 | Resolved: 2023-11-13

## 2024-01-26 ENCOUNTER — HOSPITAL ENCOUNTER (EMERGENCY)
Facility: HOSPITAL | Age: 59
Discharge: HOME | End: 2024-01-26
Attending: EMERGENCY MEDICINE
Payer: COMMERCIAL

## 2024-01-26 VITALS
HEART RATE: 74 BPM | HEIGHT: 67 IN | OXYGEN SATURATION: 98 % | RESPIRATION RATE: 20 BRPM | WEIGHT: 192 LBS | SYSTOLIC BLOOD PRESSURE: 124 MMHG | DIASTOLIC BLOOD PRESSURE: 82 MMHG | TEMPERATURE: 98 F | BODY MASS INDEX: 30.13 KG/M2

## 2024-01-26 DIAGNOSIS — L02.91 ABSCESS: Primary | ICD-10-CM

## 2024-01-26 PROCEDURE — 99283 EMERGENCY DEPT VISIT LOW MDM: CPT | Mod: 25

## 2024-01-26 PROCEDURE — 10060 I&D ABSCESS SIMPLE/SINGLE: CPT

## 2024-01-26 PROCEDURE — 25000003 PHARM REV CODE 250: Performed by: NURSE PRACTITIONER

## 2024-01-26 RX ORDER — LIDOCAINE HYDROCHLORIDE 10 MG/ML
10 INJECTION INFILTRATION; PERINEURAL
Status: COMPLETED | OUTPATIENT
Start: 2024-01-26 | End: 2024-01-26

## 2024-01-26 RX ORDER — ONDANSETRON 4 MG/1
4 TABLET, ORALLY DISINTEGRATING ORAL
Status: COMPLETED | OUTPATIENT
Start: 2024-01-26 | End: 2024-01-26

## 2024-01-26 RX ORDER — HYDROCODONE BITARTRATE AND ACETAMINOPHEN 10; 325 MG/1; MG/1
1 TABLET ORAL
Status: COMPLETED | OUTPATIENT
Start: 2024-01-26 | End: 2024-01-26

## 2024-01-26 RX ADMIN — ONDANSETRON 4 MG: 4 TABLET, ORALLY DISINTEGRATING ORAL at 04:01

## 2024-01-26 RX ADMIN — LIDOCAINE HYDROCHLORIDE 10 ML: 10 INJECTION, SOLUTION INFILTRATION; PERINEURAL at 04:01

## 2024-01-26 RX ADMIN — HYDROCODONE BITARTRATE AND ACETAMINOPHEN 1 TABLET: 10; 325 TABLET ORAL at 04:01

## 2024-01-26 NOTE — DISCHARGE INSTRUCTIONS
Continue taking antibiotics as ordered.  Change wound dressing as needed.  In 2 days return to the emergency department or see your primary doctor/urgent care for wound packing removal.  Return the ER for worsening condition.

## 2024-01-26 NOTE — ED PROVIDER NOTES
Encounter Date: 1/26/2024       History     Chief Complaint   Patient presents with    Abscess     Pt stated that for the past 2 weeks pt has been having an abscess to left upper back/shoulder area with pain/reddening to area. Seen at Urgent Care on Monday and Rx clindamycin / mupirocin with no improvement.   Also reporting fever for the past 2 days.      This is a 58-year-old white male with noncontributory past medical history who presents to the emergency department with complaints of tender, swollen bump present for about 1 week.  He was evaluated by urgent care 5 days ago and prescribed clindamycin with mupirocin, and patient denies improvement in symptoms.  He denies fever or vomiting.      Review of patient's allergies indicates:   Allergen Reactions    Penicillins Rash     Past Medical History:   Diagnosis Date    Bradycardia     Chronic back pain     Diaphragmatic hernia without mention of obstruction or gangrene 05/28/2015    GERD (gastroesophageal reflux disease)     Unilateral inguinal hernia, without obstruction or gangrene, not specified as recurrent 08/2023    LEFT     Past Surgical History:   Procedure Laterality Date    ESOPHAGOGASTRODUODENOSCOPY  05/2015    HERNIA REPAIR      NECK SURGERY      Cut muscle    REPAIR, HERNIA, INGUINAL Left 8/22/2023    Procedure: REPAIR, HERNIA, INGUINAL with mesh;  Surgeon: Cadence Hernandez MD;  Location: [facility] OR;  Service: General;  Laterality: Left;  7th 1100    TONSILLECTOMY       Family History   Problem Relation Age of Onset    No Known Problems Mother     Cancer Father     Lung cancer Sister     No Known Problems Sister     No Known Problems Brother      Social History     Tobacco Use    Smoking status: Never    Smokeless tobacco: Never   Substance Use Topics    Alcohol use: No    Drug use: No     Review of Systems   Constitutional:  Negative for appetite change and fever.   Skin:  Positive for color change and wound.       Physical Exam     Initial Vitals  [01/26/24 1626]   BP Pulse Resp Temp SpO2   (!) 157/68 74 18 98.3 °F (36.8 °C) 98 %      MAP       --         Physical Exam    Nursing note and vitals reviewed.  Constitutional: He appears well-developed and well-nourished. He is active. No distress.   HENT:   Head: Normocephalic and atraumatic.   Eyes: EOM are normal. Pupils are equal, round, and reactive to light.   Neck: Neck supple.   Normal range of motion.  Cardiovascular:  Normal rate.           Pulmonary/Chest: No respiratory distress.   Musculoskeletal:      Cervical back: Normal range of motion and neck supple.        Back:      Neurological: He is alert and oriented to person, place, and time. GCS score is 15. GCS eye subscore is 4. GCS verbal subscore is 5. GCS motor subscore is 6.   Skin: Skin is warm and dry. Capillary refill takes less than 2 seconds. Abscess noted.   Psychiatric: He has a normal mood and affect. His behavior is normal. Thought content normal.         ED Course   I & D - Incision and Drainage    Date/Time: 1/26/2024 5:17 PM  Location procedure was performed: [facility] EMERGENCY DEPARTMENT    Performed by: Nel Pimentel NP  Authorized by: Toño Dean MD  Type: abscess  Body area: trunk  Location details: back  Anesthesia: local infiltration    Anesthesia:  Local Anesthetic: lidocaine 1% without epinephrine  Anesthetic total: 5 mL  Scalpel size: 11  Incision type: single straight  Incision depth: dermal and subcutaneous  Complexity: simple  Drainage: pus, serosanguinous, purulent and bloody  Drainage amount: copious  Wound treatment: wound packed  Packing material: 1/4 in iodoform gauze  Estimated blood loss (mL): 3  Patient tolerance: Patient tolerated the procedure well with no immediate complications    Incision depth: dermal and subcutaneous        Labs Reviewed - No data to display       Imaging Results    None          Medications   ondansetron disintegrating tablet 4 mg (4 mg Oral Given 1/26/24 5627)    HYDROcodone-acetaminophen  mg per tablet 1 tablet (1 tablet Oral Given 1/26/24 1637)   LIDOcaine HCL 10 mg/ml (1%) injection 10 mL (10 mLs Other Given 1/26/24 1634)     Medical Decision Making  Risk  Prescription drug management.                                      Clinical Impression:  Final diagnoses:  [L02.91] Abscess (Primary)          ED Disposition Condition    Discharge Stable          ED Prescriptions    None       Follow-up Information       Follow up With Specialties Details Why Contact Info    Alireza Appiah Jr., MD Internal Medicine Schedule an appointment as soon as possible for a visit in 2 days for re-evaluation of today's complaint and wound packing removal [address] [place] [id number]  [phone]               Nel Pimentel, HARMONY  01/26/24 9366

## 2024-10-21 DIAGNOSIS — Z12.11 SCREENING FOR COLON CANCER: ICD-10-CM

## (undated) DEVICE — SYR IRRIGATION BULB STER 60ML

## (undated) DEVICE — TAPE ADH MEDIPORE 4 X 10YDS

## (undated) DEVICE — UNDERPAD DISPOSABLE 30X30IN

## (undated) DEVICE — SUT SA85H SILK 2-0

## (undated) DEVICE — LINER MEDI-VAC PPV FLTR 1500CC

## (undated) DEVICE — COVER OVERHEAD SURG LT BLUE

## (undated) DEVICE — SUT 0 VICRYL / UR6 (J603)

## (undated) DEVICE — NDL HYPODERMIC SAF 22G 1.5IN

## (undated) DEVICE — DRAIN PENROSE SIL 0.5X18IN

## (undated) DEVICE — COUNTER NDL DBL MAG 100

## (undated) DEVICE — SOL NACL IRR 1000ML BTL

## (undated) DEVICE — TUBE SUC UNIVERSAL .25XIN 6FT

## (undated) DEVICE — CLIPPER BLADE MOD 4406 (CAREF)

## (undated) DEVICE — GLOVE SENSICARE PI SURG 6

## (undated) DEVICE — SPONGE GAUZE 16PLY 4X4

## (undated) DEVICE — CAUTERY PUSHBUTTON PENCIL

## (undated) DEVICE — CLOSURE SKIN STERI STRIP 1/2X4

## (undated) DEVICE — GLOVE SENSICARE PI SURG 7

## (undated) DEVICE — STRIP MEDI WND CLSR 1/2X4IN

## (undated) DEVICE — BLADE SURG #15 CARBON STEEL

## (undated) DEVICE — SUT 3-0 VICRYL / SH (J416)

## (undated) DEVICE — GLOVE SENSICARE PI GRN 6.5

## (undated) DEVICE — ADHESIVE MASTISOL VIAL 48/BX

## (undated) DEVICE — SPONGE LAP 18X18 PREWASHED

## (undated) DEVICE — DRAIN PENROSE XRAY 18 X 1/4 ST

## (undated) DEVICE — GLOVE SURGICAL LATEX SZ 6.5

## (undated) DEVICE — SPONGE POST-OP GAUZE 4X4IN

## (undated) DEVICE — TRAY MAJOR LAPAROTOMY SURG I

## (undated) DEVICE — ELECTRODE REM PLYHSV RETURN 9

## (undated) DEVICE — BLANKET UPPER BODY 78.7X29.9IN

## (undated) DEVICE — SUT MONOCYRL 4-0 PS2 UND

## (undated) DEVICE — STRAP KNEE & BODY DISP 4X34IN

## (undated) DEVICE — SYR 10CC LUER LOCK

## (undated) DEVICE — TIP YANKAUERS BULB NO VENT

## (undated) DEVICE — APPLICATOR CHLORAPREP ORN 26ML

## (undated) DEVICE — GLOVE SENSICARE PI GRN 7

## (undated) DEVICE — TOWEL OR XRAY WHITE 17X26IN